# Patient Record
Sex: MALE | Race: OTHER | HISPANIC OR LATINO | URBAN - METROPOLITAN AREA
[De-identification: names, ages, dates, MRNs, and addresses within clinical notes are randomized per-mention and may not be internally consistent; named-entity substitution may affect disease eponyms.]

---

## 2017-11-28 ENCOUNTER — EMERGENCY (EMERGENCY)
Facility: HOSPITAL | Age: 27
LOS: 1 days | Discharge: HOME | End: 2017-11-28
Attending: EMERGENCY MEDICINE
Payer: MEDICAID

## 2017-11-28 VITALS
HEART RATE: 70 BPM | DIASTOLIC BLOOD PRESSURE: 66 MMHG | SYSTOLIC BLOOD PRESSURE: 134 MMHG | OXYGEN SATURATION: 99 % | RESPIRATION RATE: 16 BRPM | TEMPERATURE: 98 F

## 2017-11-28 VITALS
OXYGEN SATURATION: 98 % | TEMPERATURE: 98 F | RESPIRATION RATE: 18 BRPM | HEART RATE: 68 BPM | DIASTOLIC BLOOD PRESSURE: 78 MMHG | SYSTOLIC BLOOD PRESSURE: 149 MMHG

## 2017-11-28 LAB
ALBUMIN SERPL ELPH-MCNC: 4.6 G/DL — SIGNIFICANT CHANGE UP (ref 3.5–5)
ALP SERPL-CCNC: 86 U/L — SIGNIFICANT CHANGE UP (ref 40–120)
ALT FLD-CCNC: 53 U/L DA — SIGNIFICANT CHANGE UP (ref 10–60)
ANION GAP SERPL CALC-SCNC: 8 MMOL/L — SIGNIFICANT CHANGE UP (ref 5–17)
APPEARANCE UR: CLEAR — SIGNIFICANT CHANGE UP
AST SERPL-CCNC: 32 U/L — SIGNIFICANT CHANGE UP (ref 10–40)
BASOPHILS # BLD AUTO: 0.1 K/UL — SIGNIFICANT CHANGE UP (ref 0–0.2)
BASOPHILS NFR BLD AUTO: 0.7 % — SIGNIFICANT CHANGE UP (ref 0–2)
BILIRUB SERPL-MCNC: 0.5 MG/DL — SIGNIFICANT CHANGE UP (ref 0.2–1.2)
BILIRUB UR-MCNC: NEGATIVE — SIGNIFICANT CHANGE UP
BUN SERPL-MCNC: 16 MG/DL — SIGNIFICANT CHANGE UP (ref 7–18)
CALCIUM SERPL-MCNC: 9.3 MG/DL — SIGNIFICANT CHANGE UP (ref 8.4–10.5)
CHLORIDE SERPL-SCNC: 104 MMOL/L — SIGNIFICANT CHANGE UP (ref 96–108)
CO2 SERPL-SCNC: 25 MMOL/L — SIGNIFICANT CHANGE UP (ref 22–31)
COLOR SPEC: YELLOW — SIGNIFICANT CHANGE UP
CREAT SERPL-MCNC: 0.78 MG/DL — SIGNIFICANT CHANGE UP (ref 0.5–1.3)
DIFF PNL FLD: ABNORMAL
EOSINOPHIL # BLD AUTO: 0 K/UL — SIGNIFICANT CHANGE UP (ref 0–0.5)
EOSINOPHIL NFR BLD AUTO: 0.3 % — SIGNIFICANT CHANGE UP (ref 0–6)
GLUCOSE SERPL-MCNC: 93 MG/DL — SIGNIFICANT CHANGE UP (ref 70–99)
GLUCOSE UR QL: NEGATIVE — SIGNIFICANT CHANGE UP
HCT VFR BLD CALC: 51.8 % — HIGH (ref 39–50)
HGB BLD-MCNC: 16.3 G/DL — SIGNIFICANT CHANGE UP (ref 13–17)
KETONES UR-MCNC: NEGATIVE — SIGNIFICANT CHANGE UP
LEUKOCYTE ESTERASE UR-ACNC: ABNORMAL
LIDOCAIN IGE QN: 130 U/L — SIGNIFICANT CHANGE UP (ref 73–393)
LYMPHOCYTES # BLD AUTO: 26.6 % — SIGNIFICANT CHANGE UP (ref 13–44)
LYMPHOCYTES # BLD AUTO: 3.3 K/UL — SIGNIFICANT CHANGE UP (ref 1–3.3)
MCHC RBC-ENTMCNC: 30.2 PG — SIGNIFICANT CHANGE UP (ref 27–34)
MCHC RBC-ENTMCNC: 31.4 GM/DL — LOW (ref 32–36)
MCV RBC AUTO: 96 FL — SIGNIFICANT CHANGE UP (ref 80–100)
MONOCYTES # BLD AUTO: 0.8 K/UL — SIGNIFICANT CHANGE UP (ref 0–0.9)
MONOCYTES NFR BLD AUTO: 6 % — SIGNIFICANT CHANGE UP (ref 2–14)
NEUTROPHILS # BLD AUTO: 8.3 K/UL — HIGH (ref 1.8–7.4)
NEUTROPHILS NFR BLD AUTO: 66.4 % — SIGNIFICANT CHANGE UP (ref 43–77)
NITRITE UR-MCNC: NEGATIVE — SIGNIFICANT CHANGE UP
PH UR: 5 — SIGNIFICANT CHANGE UP (ref 5–8)
PLATELET # BLD AUTO: 305 K/UL — SIGNIFICANT CHANGE UP (ref 150–400)
POTASSIUM SERPL-MCNC: 4.1 MMOL/L — SIGNIFICANT CHANGE UP (ref 3.5–5.3)
POTASSIUM SERPL-SCNC: 4.1 MMOL/L — SIGNIFICANT CHANGE UP (ref 3.5–5.3)
PROT SERPL-MCNC: 8.5 G/DL — HIGH (ref 6–8.3)
PROT UR-MCNC: NEGATIVE — SIGNIFICANT CHANGE UP
RBC # BLD: 5.4 M/UL — SIGNIFICANT CHANGE UP (ref 4.2–5.8)
RBC # FLD: 12.6 % — SIGNIFICANT CHANGE UP (ref 10.3–14.5)
SODIUM SERPL-SCNC: 137 MMOL/L — SIGNIFICANT CHANGE UP (ref 135–145)
SP GR SPEC: 1.02 — SIGNIFICANT CHANGE UP (ref 1.01–1.02)
UROBILINOGEN FLD QL: NEGATIVE — SIGNIFICANT CHANGE UP
WBC # BLD: 12.6 K/UL — HIGH (ref 3.8–10.5)
WBC # FLD AUTO: 12.6 K/UL — HIGH (ref 3.8–10.5)

## 2017-11-28 PROCEDURE — 99284 EMERGENCY DEPT VISIT MOD MDM: CPT | Mod: 25

## 2017-11-28 PROCEDURE — 83690 ASSAY OF LIPASE: CPT

## 2017-11-28 PROCEDURE — 81001 URINALYSIS AUTO W/SCOPE: CPT

## 2017-11-28 PROCEDURE — 96374 THER/PROPH/DIAG INJ IV PUSH: CPT | Mod: 25

## 2017-11-28 PROCEDURE — 80053 COMPREHEN METABOLIC PANEL: CPT

## 2017-11-28 PROCEDURE — 96375 TX/PRO/DX INJ NEW DRUG ADDON: CPT

## 2017-11-28 PROCEDURE — 99285 EMERGENCY DEPT VISIT HI MDM: CPT

## 2017-11-28 PROCEDURE — 85027 COMPLETE CBC AUTOMATED: CPT

## 2017-11-28 RX ORDER — FAMOTIDINE 10 MG/ML
20 INJECTION INTRAVENOUS ONCE
Qty: 0 | Refills: 0 | Status: COMPLETED | OUTPATIENT
Start: 2017-11-28 | End: 2017-11-28

## 2017-11-28 RX ORDER — SODIUM CHLORIDE 9 MG/ML
1000 INJECTION INTRAMUSCULAR; INTRAVENOUS; SUBCUTANEOUS ONCE
Qty: 0 | Refills: 0 | Status: COMPLETED | OUTPATIENT
Start: 2017-11-28 | End: 2017-11-28

## 2017-11-28 RX ORDER — ONDANSETRON 8 MG/1
4 TABLET, FILM COATED ORAL ONCE
Qty: 0 | Refills: 0 | Status: COMPLETED | OUTPATIENT
Start: 2017-11-28 | End: 2017-11-28

## 2017-11-28 RX ADMIN — ONDANSETRON 4 MILLIGRAM(S): 8 TABLET, FILM COATED ORAL at 15:35

## 2017-11-28 RX ADMIN — SODIUM CHLORIDE 2000 MILLILITER(S): 9 INJECTION INTRAMUSCULAR; INTRAVENOUS; SUBCUTANEOUS at 15:35

## 2017-11-28 RX ADMIN — FAMOTIDINE 20 MILLIGRAM(S): 10 INJECTION INTRAVENOUS at 15:36

## 2017-11-28 NOTE — ED PROVIDER NOTE - ATTENDING CONTRIBUTION TO CARE
26yo male presents with diarrhea x 3 days, described as watery, became bloody today. pt has a h/o hemorrhoids. He states that he develops lower abdominal 28yo male presents with diarrhea x 3 days, described as watery, became bloody today. pt has a h/o hemorrhoids. He states that he develops lower abdominal pain with a feeling that he needs to defecate. He defecates with some relief. Pt felt mild nausea today but no fevers. WBC mildly elevated at 12.3. No signs of renal dysfunction. Pt admits to eating [employer] a few days ago. Set up pt to be seen at pcp's office with dr. collazo on 12/30/17 at 12:15pm.

## 2017-11-28 NOTE — ED PROVIDER NOTE - OBJECTIVE STATEMENT
26 y/o M pt with a significant PMHx of hemorrhoids and no significant PSHx presents to the ED c/o diarrhea x3 days with associated abd pain. Partner states that pt took Imodium yesterday and two tablets this morning to temporary relief. Pt states that he feels rectal pains similar to when he has a hemorrhoid flare. Pt denies fever, chills, hematuria, burning urination, dysuria, sick contacts, recent travel or any other complaints. NKDA.

## 2017-11-28 NOTE — ED PROVIDER NOTE - MEDICAL DECISION MAKING DETAILS
28 y/o M pt, well appearing, vitals stable, afebrile, tolerated PO with no vomiting presents to the ED with watery diarrhea. Will hydrate, check basic labs and reassess. Had conversation regarding discontinuing Imodium on discharge as it is necessary to excrete possible virus.

## 2018-02-06 ENCOUNTER — EMERGENCY (EMERGENCY)
Facility: HOSPITAL | Age: 28
LOS: 1 days | Discharge: HOME | End: 2018-02-06
Attending: EMERGENCY MEDICINE
Payer: COMMERCIAL

## 2018-02-06 VITALS
TEMPERATURE: 98 F | SYSTOLIC BLOOD PRESSURE: 127 MMHG | RESPIRATION RATE: 18 BRPM | DIASTOLIC BLOOD PRESSURE: 69 MMHG | HEART RATE: 62 BPM | OXYGEN SATURATION: 99 %

## 2018-02-06 LAB
ALBUMIN SERPL ELPH-MCNC: 4.2 G/DL — SIGNIFICANT CHANGE UP (ref 3.5–5)
ALP SERPL-CCNC: 87 U/L — SIGNIFICANT CHANGE UP (ref 40–120)
ALT FLD-CCNC: 46 U/L DA — SIGNIFICANT CHANGE UP (ref 10–60)
ANION GAP SERPL CALC-SCNC: 5 MMOL/L — SIGNIFICANT CHANGE UP (ref 5–17)
APTT BLD: 29.2 SEC — SIGNIFICANT CHANGE UP (ref 27.5–37.4)
AST SERPL-CCNC: 25 U/L — SIGNIFICANT CHANGE UP (ref 10–40)
BASOPHILS # BLD AUTO: 0.1 K/UL — SIGNIFICANT CHANGE UP (ref 0–0.2)
BASOPHILS NFR BLD AUTO: 1 % — SIGNIFICANT CHANGE UP (ref 0–2)
BILIRUB SERPL-MCNC: 0.4 MG/DL — SIGNIFICANT CHANGE UP (ref 0.2–1.2)
BUN SERPL-MCNC: 22 MG/DL — HIGH (ref 7–18)
CALCIUM SERPL-MCNC: 9.1 MG/DL — SIGNIFICANT CHANGE UP (ref 8.4–10.5)
CHLORIDE SERPL-SCNC: 106 MMOL/L — SIGNIFICANT CHANGE UP (ref 96–108)
CO2 SERPL-SCNC: 28 MMOL/L — SIGNIFICANT CHANGE UP (ref 22–31)
CREAT SERPL-MCNC: 0.81 MG/DL — SIGNIFICANT CHANGE UP (ref 0.5–1.3)
EOSINOPHIL # BLD AUTO: 0.1 K/UL — SIGNIFICANT CHANGE UP (ref 0–0.5)
EOSINOPHIL NFR BLD AUTO: 0.9 % — SIGNIFICANT CHANGE UP (ref 0–6)
GLUCOSE SERPL-MCNC: 91 MG/DL — SIGNIFICANT CHANGE UP (ref 70–99)
HCT VFR BLD CALC: 44.3 % — SIGNIFICANT CHANGE UP (ref 39–50)
HGB BLD-MCNC: 14.6 G/DL — SIGNIFICANT CHANGE UP (ref 13–17)
INR BLD: 1.22 RATIO — HIGH (ref 0.88–1.16)
LACTATE SERPL-SCNC: 1 MMOL/L — SIGNIFICANT CHANGE UP (ref 0.7–2)
LIDOCAIN IGE QN: 172 U/L — SIGNIFICANT CHANGE UP (ref 73–393)
LYMPHOCYTES # BLD AUTO: 3.8 K/UL — HIGH (ref 1–3.3)
LYMPHOCYTES # BLD AUTO: 39.9 % — SIGNIFICANT CHANGE UP (ref 13–44)
MCHC RBC-ENTMCNC: 29.7 PG — SIGNIFICANT CHANGE UP (ref 27–34)
MCHC RBC-ENTMCNC: 33 GM/DL — SIGNIFICANT CHANGE UP (ref 32–36)
MCV RBC AUTO: 90.1 FL — SIGNIFICANT CHANGE UP (ref 80–100)
MONOCYTES # BLD AUTO: 0.6 K/UL — SIGNIFICANT CHANGE UP (ref 0–0.9)
MONOCYTES NFR BLD AUTO: 6.6 % — SIGNIFICANT CHANGE UP (ref 2–14)
NEUTROPHILS # BLD AUTO: 4.9 K/UL — SIGNIFICANT CHANGE UP (ref 1.8–7.4)
NEUTROPHILS NFR BLD AUTO: 51.5 % — SIGNIFICANT CHANGE UP (ref 43–77)
PLATELET # BLD AUTO: 262 K/UL — SIGNIFICANT CHANGE UP (ref 150–400)
POTASSIUM SERPL-MCNC: 4.2 MMOL/L — SIGNIFICANT CHANGE UP (ref 3.5–5.3)
POTASSIUM SERPL-SCNC: 4.2 MMOL/L — SIGNIFICANT CHANGE UP (ref 3.5–5.3)
PROT SERPL-MCNC: 7.2 G/DL — SIGNIFICANT CHANGE UP (ref 6–8.3)
PROTHROM AB SERPL-ACNC: 13.3 SEC — HIGH (ref 9.8–12.7)
RBC # BLD: 4.92 M/UL — SIGNIFICANT CHANGE UP (ref 4.2–5.8)
RBC # FLD: 13 % — SIGNIFICANT CHANGE UP (ref 10.3–14.5)
SODIUM SERPL-SCNC: 139 MMOL/L — SIGNIFICANT CHANGE UP (ref 135–145)
WBC # BLD: 9.5 K/UL — SIGNIFICANT CHANGE UP (ref 3.8–10.5)
WBC # FLD AUTO: 9.5 K/UL — SIGNIFICANT CHANGE UP (ref 3.8–10.5)

## 2018-02-06 PROCEDURE — 99284 EMERGENCY DEPT VISIT MOD MDM: CPT | Mod: 25

## 2018-02-06 PROCEDURE — 74177 CT ABD & PELVIS W/CONTRAST: CPT

## 2018-02-06 PROCEDURE — 74177 CT ABD & PELVIS W/CONTRAST: CPT | Mod: 26

## 2018-02-06 PROCEDURE — 99285 EMERGENCY DEPT VISIT HI MDM: CPT

## 2018-02-06 RX ORDER — SODIUM CHLORIDE 9 MG/ML
1000 INJECTION INTRAMUSCULAR; INTRAVENOUS; SUBCUTANEOUS ONCE
Qty: 0 | Refills: 0 | Status: COMPLETED | OUTPATIENT
Start: 2018-02-06 | End: 2018-02-06

## 2018-02-06 RX ADMIN — SODIUM CHLORIDE 1000 MILLILITER(S): 9 INJECTION INTRAMUSCULAR; INTRAVENOUS; SUBCUTANEOUS at 20:11

## 2018-02-06 NOTE — ED PROVIDER NOTE - OBJECTIVE STATEMENT
this is a 26 yo male presenting to the ED with complaints RLQ pain that started yesterday that originated near his umbilicus and moved this morning. pt reports subjective fever. denies nausea, vomiting, chills, diarrhea, constipation, any other concerns. no other complaints at this time.

## 2018-02-06 NOTE — ED PROVIDER NOTE - MEDICAL DECISION MAKING DETAILS
28 yo male with periumbilical pain radiating to RLQ with Mcburney point tenderness and guarding suspicious for appendicitis. will eval labs and CT.

## 2018-02-06 NOTE — ED PROVIDER NOTE - PHYSICAL EXAMINATION
PE: GEN: Awake, alert, interactive, NAD, non-toxic appearing.   HEAD: Atraumatic  EYES: PERRLA  CARDIAC: Reg rate and rhythm, S1,S2, no murmur/rub/gallop. Strong central and peripheral pulses, Brisk cap refill, no evident pedal edema.   RESP: No distress noted. L/S clear = Bilat without accessory muscle use, wheeze, rhonchi, rales.   ABD: soft, supple, + RLQ tenderness/McBurney point tenderness, walks with holding RLQ + guarding, neg rebound. BS x 4, normoactive.   NEURO: AOx3, CN II-XII grossly intact without focal deficit.   MSK: Moving all extremities with no apparent deformities.   SKIN: Warm, dry, normal color, without apparent rashes.

## 2018-02-06 NOTE — ED PROVIDER NOTE - NS ED ROS FT
Constitutional: + Fever, - Chills, - Anorexia, - Fatigue  Eyes: - Discharge, - Irritation, - Redness, - Visual changes, - Light sensitivity  EARS: - Ear Pain, - Apparent hearing problems  NOSE: - Congestion, - Bloody nose  MOUTH/THROAT: - Vocal Changes, - Drooling, - Sore throat  NECK: - Lumps, - Stiffness, - Pain  CV: - Palpitations, - Chest Pain, - Edema   RESP:  - Cough, - Shortness of Breath, - Dyspnea on Exertion, - Trouble speaking, - Pain with breathing, - Wheezing  GI: - Diarrhea, - Constipation, - Bloody stools, - Nausea, - Vomiting, + Abdominal Pain  MSK: - Myalgias, - Arthralgias, - Weakness, - Deformities, - Injuries  SKIN: - Color change, - Rash, - Swelling, - Bruises, - Wounds  NEURO: - Change in behavior, - Dec. Alertness, - Headache, - Dizziness, - Change in speech, - Weakness

## 2018-02-06 NOTE — ED PROVIDER NOTE - PROGRESS NOTE DETAILS
pt feels unchanged as long as not walking and no one touching his abd. pt went to CT. discussed lab results with pt at this time. [place] DO: CT reported normal appendix, +appendagitis, + Stoools. Rx IBU and colace. Pt is well appearing walking with normal gait, stable for discharge and follow up with medical doctor. Pt educated on care and need for follow up. Discussed anticipatory guidance and return precautions. Questions answered. I had a detailed discussion with the patient and/or guardian regarding the historical points, exam findings, and any diagnostic results supporting the discharge diagnosis.. Pt drank fluids no vomting, no guarding to abdominal palpation.

## 2018-02-07 VITALS
OXYGEN SATURATION: 100 % | TEMPERATURE: 98 F | HEART RATE: 60 BPM | RESPIRATION RATE: 16 BRPM | SYSTOLIC BLOOD PRESSURE: 124 MMHG | DIASTOLIC BLOOD PRESSURE: 63 MMHG

## 2018-02-07 RX ORDER — DOCUSATE SODIUM 100 MG
1 CAPSULE ORAL
Qty: 20 | Refills: 0 | OUTPATIENT
Start: 2018-02-07

## 2018-02-07 RX ORDER — IBUPROFEN 200 MG
1 TABLET ORAL
Qty: 20 | Refills: 0 | OUTPATIENT
Start: 2018-02-07

## 2020-11-11 NOTE — ED ADULT NURSE NOTE - NS ED NURSE DISCH DISPOSITION
Patient expressed additional labs was not ordered. Lipid panel was not included. Due to annual follow up, scheduled tomorrow. Discharged

## 2022-01-01 NOTE — ED ADULT NURSE NOTE - CINV DISCH MEDS REVIEWED YN
Yes
Normal variations in rate and rhythm/Breathing unlabored/Grunting absent/Intercostal, supracostal  and subcostal muscles with normal excursion and not retracting

## 2022-02-16 NOTE — ED ADULT NURSE NOTE - NS ED NURSE RECORD ANOTHER VITAL SIGN
Agueda, patient's daughter called asking can Entresto be changed to another medication that is less expensive. pat is [facility] . Please call    Yes

## 2024-03-04 NOTE — ED ADULT NURSE NOTE - NS ED NOTE ABUSE SUSPICION NEGLECT YN
3/4/2024  I, Anand Lowery DO, saw and evaluated the patient. I have discussed the patient with the resident/non-physician practitioner and agree with the resident's/non-physician practitioner's findings, Plan of Care, and MDM as documented in the resident's/non-physician practitioner's note, except where noted. All available labs and Radiology studies were reviewed.  I was present for key portions of any procedure(s) performed by the resident/non-physician practitioner and I was immediately available to provide assistance.       At this point I agree with the current assessment done in the Emergency Department.  I have conducted an independent evaluation of this patient a history and physical is as follows:    62-year-old male presents to the ED for evaluation of syncope.  Was having an x-ray done earlier today in the outpatient setting when he had a syncopal episode.  Fell backwards and hit his head.  He is not on any anticoagulation.  Has persisting lightheadedness since the episode.  Was getting x-ray due to upper respiratory type symptoms and wheezing.  On exam, does have inspiratory/expiratory coarse breath sounds and wheezing that was speaking in full sentences with no respiratory difficulty, pulse is regular with normal rate, abdomen soft nontender nondistended, moving all extremities with no gross motor deficit.  EKG is nonischemic as interpreted by myself.  Labs show mild leukocytosis 11.11, stable hemoglobin, no acute electrolyte abnormalities, normal renal function, no acute LFT abnormalities, troponin is negative x 2.  CT head shows no acute intracranial pathology.  Patient was medicated with nebulizer treatment with improvement in symptoms.  Results discussed.  Plan will be for discharge with close outpatient follow-up.  Referral placed to cardiology.  Stable for discharge and agreeable to the plan.  Strict return ED precautions given    ED Course         Critical Care Time  Procedures       No
